# Patient Record
Sex: FEMALE | Race: WHITE | NOT HISPANIC OR LATINO | Employment: FULL TIME | URBAN - METROPOLITAN AREA
[De-identification: names, ages, dates, MRNs, and addresses within clinical notes are randomized per-mention and may not be internally consistent; named-entity substitution may affect disease eponyms.]

---

## 2024-05-29 ENCOUNTER — OFFICE VISIT (OUTPATIENT)
Age: 34
End: 2024-05-29
Payer: COMMERCIAL

## 2024-05-29 VITALS
WEIGHT: 217.5 LBS | DIASTOLIC BLOOD PRESSURE: 82 MMHG | OXYGEN SATURATION: 94 % | BODY MASS INDEX: 36.24 KG/M2 | HEIGHT: 65 IN | SYSTOLIC BLOOD PRESSURE: 120 MMHG | HEART RATE: 68 BPM

## 2024-05-29 DIAGNOSIS — M25.511 CHRONIC RIGHT SHOULDER PAIN: ICD-10-CM

## 2024-05-29 DIAGNOSIS — G89.29 CHRONIC RIGHT SHOULDER PAIN: ICD-10-CM

## 2024-05-29 DIAGNOSIS — Z23 NEED FOR VACCINATION: ICD-10-CM

## 2024-05-29 DIAGNOSIS — Z00.00 WELL ADULT EXAM: Primary | ICD-10-CM

## 2024-05-29 DIAGNOSIS — E66.09 CLASS 2 OBESITY DUE TO EXCESS CALORIES WITHOUT SERIOUS COMORBIDITY WITH BODY MASS INDEX (BMI) OF 36.0 TO 36.9 IN ADULT: ICD-10-CM

## 2024-05-29 PROCEDURE — 90471 IMMUNIZATION ADMIN: CPT | Performed by: FAMILY MEDICINE

## 2024-05-29 PROCEDURE — 99385 PREV VISIT NEW AGE 18-39: CPT | Performed by: FAMILY MEDICINE

## 2024-05-29 PROCEDURE — 90715 TDAP VACCINE 7 YRS/> IM: CPT | Performed by: FAMILY MEDICINE

## 2024-05-29 NOTE — LETTER
[facility]  Vaccine Consent Form    Patient Name:  Fela Landaverde  Patient :  1990     Vaccine(s) Ordered    Tdap Vaccine Greater Than or Equal To 6yo IM        Screening Checklist  The following questions should be completed prior to vaccination. If you answer “yes” to any question, it does not necessarily mean you should not be vaccinated. It just means we may need to clarify or ask more questions. If a question is unclear, please ask your healthcare provider to explain it.    Yes No   Any fever or moderate to severe illness today (mild illness and/or antibiotic treatment are not contraindications)?     Do you have a history of a serious reaction to any previous vaccinations, such as anaphylaxis, encephalopathy within 7 days, Guillain-Chatfield syndrome within 6 weeks, seizure?     Have you received any live vaccine(s) (e.g MMR, BUBBA) or any other vaccines in the last month (to ensure duplicate doses aren't given)?     Do you have an anaphylactic allergy to latex (DTaP, DTaP-IPV, Hep A, Hep B, MenB, RV, Td, Tdap), baker’s yeast (Hep B, HPV), polysorbates (RSV, nirsevimab, PCV 20, Rotavirrus, Tdap, Shingrix), or gelatin (BUBBA, MMR)?     Do you have an anaphylactic allergy to neomycin (Rabies, BUBBA, MMR, IPV, Hep A), polymyxin B (IPV), or streptomycin (IPV)?      Any cancer, leukemia, AIDS, or other immune system disorder? (BUBBA, MMR, RV)     Do you have a parent, brother, or sister with an immune system problem (if immune competence of vaccine recipient clinically verified, can proceed)? (MMR, BUBBA)     Any recent steroid treatments for >2 weeks, chemotherapy, or radiation treatment? (BUBBA, MMR)     Have you received antibody-containing blood transfusions or IVIG in the past 11 months (recommended interval is dependent on product)? (MMR, BUBBA)     Have you taken antiviral drugs (acyclovir, famciclovir, valacyclovir for BUBBA) in the last 24 or 48 hours, respectively?      Are you pregnant or planning to  "become pregnant within 1 month? (BUBBA, MMR, HPV, IPV, MenB, Abrexvy; For Hep B- refer to Engerix-B; For RSV - Abrysvo is indicated for 32-36 weeks of pregnancy from September to January)     For infants, have you ever been told your child has had intussusception or a medical emergency involving obstruction of the intestine (Rotavirus)? If not for an infant, can skip this question.         *Ordering Physicians/APC should be consulted if \"yes\" is checked by the patient or guardian above.  I have received, read, and understand the Vaccine Information Statement (VIS) for each vaccine ordered.  I have considered my or my child's health status as well as the health status of my close contacts.  I have taken the opportunity to discuss my vaccine questions with my or my child's health care provider.   I have requested that the ordered vaccine(s) be given to me or my child.  I understand the benefits and risks of the vaccines.  I understand that I should remain in the clinic for 15 minutes after receiving the vaccine(s).  _________________________________________________________  Signature of Patient or Parent/Legal Guardian ____________________  Date     "

## 2024-05-29 NOTE — PROGRESS NOTES
"Chief Complaint  New Patient and Annual Exam    Subjective          Fela Landaverde presents to [facility] for   History of Present Illness    Highest weight 225 lbs and working on weight loss. She lost weight to 180s lbs during COVID. She grieved the death of her horse two years ago and started comfort eating. Mental health is improving. She is changing jobs as she had burnout with last job. She has h/o anxiety and depression but never took prescriptions. Intermittent fasting 20/4. She rides horses once a week. She does paddle boarding in the Summer. She takes 15 minutes walks twice a day. Goes to the gym once a week.    She had a shoulder injury in childhood from a horse. She had xrays done at  which were normal. Exercises at gym increase pain.             5/29/2024     3:29 PM   PHQ-2/PHQ-9 Depression Screening   Little Interest or Pleasure in Doing Things 0-->not at all   Feeling Down, Depressed or Hopeless 0-->not at all   PHQ-9: Brief Depression Severity Measure Score 0       Objective   Vital Signs:   Vitals:    05/29/24 1525   BP: 120/82   Pulse: 68   SpO2: 94%   Weight: 98.7 kg (217 lb 8 oz)   Height: 165.1 cm (65\")   PainSc: 0-No pain     Body mass index is 36.19 kg/m².              Physical Exam  Constitutional:       General: She is not in acute distress.     Appearance: She is obese.   HENT:      Right Ear: Tympanic membrane and ear canal normal.      Left Ear: Tympanic membrane and ear canal normal.      Nose: No congestion or rhinorrhea.      Mouth/Throat:      Mouth: Mucous membranes are moist.      Pharynx: No oropharyngeal exudate or posterior oropharyngeal erythema.   Eyes:      General:         Right eye: No discharge.         Left eye: No discharge.      Conjunctiva/sclera: Conjunctivae normal.   Neck:      Thyroid: No thyromegaly.   Cardiovascular:      Rate and Rhythm: Normal rate and regular rhythm.   Pulmonary:      Effort: Pulmonary effort is normal. "      Breath sounds: Normal breath sounds.   Abdominal:      Palpations: Abdomen is soft. There is no hepatomegaly.      Tenderness: There is no abdominal tenderness.   Musculoskeletal:      Right shoulder: No swelling, deformity, tenderness or bony tenderness. Normal range of motion. Normal strength.      Cervical back: Neck supple.   Lymphadenopathy:      Head:      Right side of head: No submandibular, preauricular or posterior auricular adenopathy.      Left side of head: No submandibular, preauricular or posterior auricular adenopathy.      Cervical: No cervical adenopathy.   Skin:     General: Skin is warm.   Neurological:      Mental Status: She is alert and oriented to person, place, and time.   Psychiatric:         Mood and Affect: Mood normal.         Behavior: Behavior normal.         Thought Content: Thought content normal.         Judgment: Judgment normal.        Result Review :                Immunization History   Administered Date(s) Administered    COVID-19 (PFIZER) Purple Cap Monovalent 03/18/2021, 04/09/2021, 01/04/2022    Tdap 05/29/2024       Health Maintenance   Topic Date Due    BMI FOLLOWUP  Never done    COVID-19 Vaccine (4 - 2023-24 season) 09/01/2023    HEPATITIS C SCREENING  Never done    ANNUAL PHYSICAL  Never done    PAP SMEAR  Never done    INFLUENZA VACCINE  08/01/2024    TDAP/TD VACCINES (2 - Td or Tdap) 05/29/2034    Pneumococcal Vaccine 0-64  Aged Out            Assessment and Plan    Diagnoses and all orders for this visit:    1. Well adult exam (Primary)  -     CBC (No Diff); Future  -     Comprehensive Metabolic Panel; Future  -     Lipid Panel; Future  -     TSH Rfx On Abnormal To Free T4; Future  Return when fasting to check labs.  Recommend cervical cancer screening Pap smear but she deferred today.  Update immunizations with Tdap.  Consider HPV vaccine series if she was not vaccinated as an adolescent.  2. Class 2 obesity due to excess calories without serious comorbidity  with body mass index (BMI) of 36.0 to 36.9 in adult  Class 2 Severe Obesity (BMI >=35 and <=39.9). Obesity-related health conditions include the following: none. Obesity is newly identified. BMI is is above average; BMI management plan is completed. We discussed low calorie, low carb based diet program, increasing exercise, and intermittent fasting using electrolytes, Mediterranean style diet, high-protein low-carb .    3. Need for vaccination  -     Tdap Vaccine Greater Than or Equal To 8yo IM    4. Chronic right shoulder pain  Recommend further evaluation with orthopedics given her previous traumatic injury.  She is changing insurances and will call back with new insurance info at the time I will place the referral.      Counseling/anticipatory guidance: Nutrition, physical activity,  mental health, , immunizations, screenings      Follow Up   Return in about 1 year (around 5/30/2025) for Physical with Pap.  Patient was given instructions and counseling regarding her condition or for health maintenance advice. Please see specific information pulled into the AVS if appropriate.      Electronically signed by Eileen Alarcon MD, 05/29/24, 4:38 PM EDT.

## 2024-08-14 ENCOUNTER — LAB (OUTPATIENT)
Age: 34
End: 2024-08-14
Payer: COMMERCIAL

## 2024-08-14 DIAGNOSIS — Z00.00 ROUTINE GENERAL MEDICAL EXAMINATION AT A HEALTH CARE FACILITY: Primary | ICD-10-CM

## 2024-08-14 DIAGNOSIS — Z00.00 WELL ADULT EXAM: ICD-10-CM

## 2024-08-14 DIAGNOSIS — R73.01 ELEVATED FASTING GLUCOSE: ICD-10-CM

## 2024-08-14 LAB
DEPRECATED RDW RBC AUTO: 41.2 FL (ref 37–54)
ERYTHROCYTE [DISTWIDTH] IN BLOOD BY AUTOMATED COUNT: 13.8 % (ref 12.3–15.4)
HCT VFR BLD AUTO: 43.3 % (ref 34–46.6)
HGB BLD-MCNC: 14.1 G/DL (ref 12–15.9)
MCH RBC QN AUTO: 26.9 PG (ref 26.6–33)
MCHC RBC AUTO-ENTMCNC: 32.6 G/DL (ref 31.5–35.7)
MCV RBC AUTO: 82.6 FL (ref 79–97)
PLATELET # BLD AUTO: 249 10*3/MM3 (ref 140–450)
PMV BLD AUTO: 13.3 FL (ref 6–12)
RBC # BLD AUTO: 5.24 10*6/MM3 (ref 3.77–5.28)
WBC NRBC COR # BLD AUTO: 5.92 10*3/MM3 (ref 3.4–10.8)

## 2024-08-14 PROCEDURE — 36415 COLL VENOUS BLD VENIPUNCTURE: CPT | Performed by: FAMILY MEDICINE

## 2024-08-14 PROCEDURE — 80050 GENERAL HEALTH PANEL: CPT | Performed by: FAMILY MEDICINE

## 2024-08-14 PROCEDURE — 80061 LIPID PANEL: CPT | Performed by: FAMILY MEDICINE

## 2024-08-14 PROCEDURE — 83036 HEMOGLOBIN GLYCOSYLATED A1C: CPT | Performed by: FAMILY MEDICINE

## 2024-08-15 DIAGNOSIS — R73.01 ELEVATED FASTING GLUCOSE: Primary | ICD-10-CM

## 2024-08-15 LAB
ALBUMIN SERPL-MCNC: 4.5 G/DL (ref 3.5–5.2)
ALBUMIN/GLOB SERPL: 1.6 G/DL
ALP SERPL-CCNC: 87 U/L (ref 39–117)
ALT SERPL W P-5'-P-CCNC: 16 U/L (ref 1–33)
ANION GAP SERPL CALCULATED.3IONS-SCNC: 11 MMOL/L (ref 5–15)
AST SERPL-CCNC: 16 U/L (ref 1–32)
BILIRUB SERPL-MCNC: 0.3 MG/DL (ref 0–1.2)
BUN SERPL-MCNC: 6 MG/DL (ref 6–20)
BUN/CREAT SERPL: 7.4 (ref 7–25)
CALCIUM SPEC-SCNC: 9.5 MG/DL (ref 8.6–10.5)
CHLORIDE SERPL-SCNC: 107 MMOL/L (ref 98–107)
CHOLEST SERPL-MCNC: 206 MG/DL (ref 0–200)
CO2 SERPL-SCNC: 22 MMOL/L (ref 22–29)
CREAT SERPL-MCNC: 0.81 MG/DL (ref 0.57–1)
EGFRCR SERPLBLD CKD-EPI 2021: 99.1 ML/MIN/1.73
GLOBULIN UR ELPH-MCNC: 2.9 GM/DL
GLUCOSE SERPL-MCNC: 108 MG/DL (ref 65–99)
HBA1C MFR BLD: 5.5 % (ref 4.8–5.6)
HDLC SERPL-MCNC: 56 MG/DL (ref 40–60)
LDLC SERPL CALC-MCNC: 139 MG/DL (ref 0–100)
LDLC/HDLC SERPL: 2.45 {RATIO}
POTASSIUM SERPL-SCNC: 4.2 MMOL/L (ref 3.5–5.2)
PROT SERPL-MCNC: 7.4 G/DL (ref 6–8.5)
SODIUM SERPL-SCNC: 140 MMOL/L (ref 136–145)
TRIGL SERPL-MCNC: 63 MG/DL (ref 0–150)
TSH SERPL DL<=0.05 MIU/L-ACNC: 0.91 UIU/ML (ref 0.27–4.2)
VLDLC SERPL-MCNC: 11 MG/DL (ref 5–40)

## 2025-06-02 ENCOUNTER — LAB (OUTPATIENT)
Age: 35
End: 2025-06-02
Payer: COMMERCIAL

## 2025-06-02 ENCOUNTER — OFFICE VISIT (OUTPATIENT)
Age: 35
End: 2025-06-02
Payer: COMMERCIAL

## 2025-06-02 VITALS
WEIGHT: 239.38 LBS | SYSTOLIC BLOOD PRESSURE: 122 MMHG | HEART RATE: 86 BPM | HEIGHT: 66 IN | OXYGEN SATURATION: 98 % | DIASTOLIC BLOOD PRESSURE: 74 MMHG | BODY MASS INDEX: 38.47 KG/M2

## 2025-06-02 DIAGNOSIS — E78.00 PURE HYPERCHOLESTEROLEMIA: ICD-10-CM

## 2025-06-02 DIAGNOSIS — R73.01 ELEVATED FASTING GLUCOSE: ICD-10-CM

## 2025-06-02 DIAGNOSIS — E66.812 CLASS 2 OBESITY DUE TO EXCESS CALORIES WITHOUT SERIOUS COMORBIDITY WITH BODY MASS INDEX (BMI) OF 38.0 TO 38.9 IN ADULT: ICD-10-CM

## 2025-06-02 DIAGNOSIS — Z00.00 WELL ADULT EXAM: ICD-10-CM

## 2025-06-02 DIAGNOSIS — Z30.45 INITIAL ENCOUNTER FOR MANAGEMENT OF CONTRACEPTIVE PATCH USE: ICD-10-CM

## 2025-06-02 DIAGNOSIS — E66.09 CLASS 2 OBESITY DUE TO EXCESS CALORIES WITHOUT SERIOUS COMORBIDITY WITH BODY MASS INDEX (BMI) OF 38.0 TO 38.9 IN ADULT: ICD-10-CM

## 2025-06-02 DIAGNOSIS — Z00.00 WELL ADULT EXAM: Primary | ICD-10-CM

## 2025-06-02 DIAGNOSIS — Z12.4 SCREENING FOR CERVICAL CANCER: ICD-10-CM

## 2025-06-02 LAB — HBA1C MFR BLD: 5.4 % (ref 4.8–5.6)

## 2025-06-02 PROCEDURE — 83036 HEMOGLOBIN GLYCOSYLATED A1C: CPT | Performed by: FAMILY MEDICINE

## 2025-06-02 PROCEDURE — 80050 GENERAL HEALTH PANEL: CPT | Performed by: FAMILY MEDICINE

## 2025-06-02 PROCEDURE — 99214 OFFICE O/P EST MOD 30 MIN: CPT | Performed by: FAMILY MEDICINE

## 2025-06-02 PROCEDURE — 80061 LIPID PANEL: CPT | Performed by: FAMILY MEDICINE

## 2025-06-02 PROCEDURE — 99395 PREV VISIT EST AGE 18-39: CPT | Performed by: FAMILY MEDICINE

## 2025-06-02 PROCEDURE — 36415 COLL VENOUS BLD VENIPUNCTURE: CPT | Performed by: FAMILY MEDICINE

## 2025-06-02 RX ORDER — NORELGESTROMIN AND ETHINYL ESTRADIOL 35; 150 UG/MG; UG/MG
1 PATCH TRANSDERMAL WEEKLY
Qty: 4 PATCH | Refills: 11 | Status: SHIPPED | OUTPATIENT
Start: 2025-06-02 | End: 2026-06-02

## 2025-06-02 NOTE — PROGRESS NOTES
Chief Complaint  Annual Exam and Gynecologic Exam    Subjective              Fela Landaverde presents to [facility] for     annual check up  Pertinent negative symptoms include no abdominal pain, no anorexia, no joint pain, no change in stool, no chest pain, no chills, no congestion, no cough, no diaphoresis, no fatigue, no fever, no headaches, no joint swelling, no myalgias, no nausea, no neck pain, no numbness, no rash, no sore throat, no swollen glands, no dysuria, no vertigo and no weakness.     History of Present Illness  The patient is a 34-year-old female presenting for an annual physical exam.    She reports no significant changes in her health status over the past year. She has been on tirzepatide for 3 weeks, which has resulted in a weight loss of approximately 15 pounds. The first week showed a significant drop in weight, followed by a gradual decrease. This medication has also led to a decrease in her appetite. Her diet is generally healthy, with a focus on protein intake, although she acknowledges the need for further dietary restrictions. She consumes protein yogurt and fruits and is working on increasing her vegetable intake. She describes herself as a selective eater. Since starting tirzepatide, her snacking habits have improved, and she no longer feels the need to constantly munch on unhealthy snacks. She has previously discussed binge eating with her therapist. She has not experienced any side effects from tirzepatide. Her goal is to achieve a weight of 130 pounds. She engages in regular physical activity, including walking and riding, and plans to start paddle boarding in the summer. She has prescription sunglasses for driving but reports no changes in her vision. She takes a multivitamin supplement.    She is considering birth control options to manage her menstrual cycle, which she finds inconvenient due to its regularity. Her menstrual cycle is typically  "28 days long, with a bleeding duration of 4 days. The flow is usually normal, although she has experienced heavy bleeding on some occasions. She expresses a desire to completely stop her periods.    GYNECOLOGICAL HISTORY:  - Cycle Length: 28 days  - Duration: 4 days  - Frequency and Flow: Normal, occasionally heavy    SOCIAL HISTORY  She has never been sexually active. She does not smoke, drink alcohol, or use drugs.    FAMILY HISTORY  There is no family history of blood clotting disorders that she is aware of.    Objective   Vital Signs:   Vitals:    06/02/25 1353   BP: 122/74   BP Location: Left arm   Patient Position: Sitting   Cuff Size: Adult   Pulse: 86   SpO2: 98%   Weight: 109 kg (239 lb 6 oz)   Height: 167 cm (65.75\")     Body mass index is 38.93 kg/m².    Class 2 Severe Obesity (BMI >=35 and <=39.9). Obesity-related health conditions include the following: impaired fasting glucose and dyslipidemias. Obesity is improving with treatment. BMI is is above average; BMI management plan is completed. We discussed increasing exercise, pharmacologic options including GLP-1, and high-protein diet.        The following portions of the patient's history were reviewed and updated as appropriate: allergies, current medications, past family history, past medical history, past social history, past surgical history, and problem list.      Physical Exam  Vitals reviewed. Exam conducted with a chaperone present.   Constitutional:       General: She is not in acute distress.     Appearance: She is obese. She is not ill-appearing.   HENT:      Right Ear: Tympanic membrane and ear canal normal.      Left Ear: Tympanic membrane and ear canal normal.      Nose: No congestion or rhinorrhea.      Mouth/Throat:      Pharynx: No oropharyngeal exudate or posterior oropharyngeal erythema.   Eyes:      General:         Right eye: No discharge.         Left eye: No discharge.      Conjunctiva/sclera: Conjunctivae normal.   Neck:      " Thyroid: No thyromegaly.   Cardiovascular:      Rate and Rhythm: Normal rate and regular rhythm.   Pulmonary:      Effort: Pulmonary effort is normal. No respiratory distress.      Breath sounds: Normal breath sounds.   Chest:   Breasts:     Right: Normal. No mass, nipple discharge, skin change or tenderness.      Left: Normal. No mass, nipple discharge, skin change or tenderness.   Abdominal:      Palpations: Abdomen is soft.      Tenderness: There is no abdominal tenderness.   Genitourinary:     General: Normal vulva.      Exam position: Lithotomy position.      Pubic Area: No rash.       Labia:         Right: No lesion.         Left: No lesion.       Urethra: No urethral lesion.      Vagina: Vaginal discharge (thin, yellow) present.      Cervix: Normal.      Uterus: Normal.       Adnexa:         Right: No mass or tenderness.          Left: No mass or tenderness.        Comments: Normal external genitalia    Musculoskeletal:      Cervical back: Neck supple.      Right lower leg: No edema.      Left lower leg: No edema.   Lymphadenopathy:      Cervical: No cervical adenopathy.      Right cervical: No superficial cervical adenopathy.     Left cervical: No superficial cervical adenopathy.      Upper Body:      Right upper body: No supraclavicular or axillary adenopathy.      Left upper body: No supraclavicular or axillary adenopathy.   Skin:     General: Skin is warm.      Findings: No rash.   Neurological:      Mental Status: She is alert and oriented to person, place, and time.      Gait: Gait normal.   Psychiatric:         Mood and Affect: Mood normal.         Behavior: Behavior normal.         Thought Content: Thought content normal.         Judgment: Judgment normal.        Result Review :                Immunization History   Administered Date(s) Administered    COVID-19 (PFIZER) Purple Cap Monovalent 03/18/2021, 04/09/2021, 01/04/2022    Tdap 05/29/2024       Health Maintenance   Topic Date Due    PAP SMEAR   Never done    HEPATITIS C SCREENING  Never done    COVID-19 Vaccine (4 - 2024-25 season) 09/01/2024    ANNUAL PHYSICAL  05/29/2025    INFLUENZA VACCINE  07/01/2025    LIPID PANEL  08/14/2025    TDAP/TD VACCINES (2 - Td or Tdap) 05/29/2034    Pneumococcal Vaccine 0-49  Aged Out            Assessment and Plan    Diagnoses and all orders for this visit:    1. Well adult exam (Primary)  -     CBC (No Diff); Future  -     Comprehensive Metabolic Panel; Future  -     Lipid Panel; Future  -     Hemoglobin A1c; Future  -     TSH Rfx On Abnormal To Free T4; Future    2. Screening for cervical cancer  -     LIQUID-BASED PAP SMEAR WITH HPV GENOTYPING IF ASCUS ([facility]); Future  -     LIQUID-BASED PAP SMEAR WITH HPV GENOTYPING IF ASCUS ([facility])    3. Class 2 obesity due to excess calories without serious comorbidity with body mass index (BMI) of 38.0 to 38.9 in adult    4. Elevated fasting glucose    5. Pure hypercholesterolemia    6. Initial encounter for management of contraceptive patch use  -     norelgestromin-ethinyl estradiol (ORTHO EVRA) 150-35 MCG/24HR; Place 1 patch on the skin as directed by provider 1 (One) Time Per Week. Continuous use skipping placebo week  Dispense: 4 patch; Refill: 11        Assessment & Plan  1. Annual physical examination.  - Cholesterol levels were mildly elevated during the last assessment in 08/2024, and blood glucose levels were borderline. Thyroid function tests were within normal limits, and there were no indications of anemia.  - Responding positively to tirzepatide, which is encouraging as it often requires a higher dosage to observe benefits. Adequate protein intake during weight fluctuations is crucial to maintain sufficient caloric and nutrient intake. She has identified the need to increase her consumption of fruits and vegetables.  - Current weight goal is ambitious, aiming for a significant reduction, which may take 1 to 2 years to achieve. It is important to prepare for  potential plateaus in weight loss progress, which can be frustrating but are a normal part of the process.  - Pap smear conducted today, and results will be available by the end of the week. Fasting blood work ordered with results expected by tomorrow.  - Immunizations are up-to-date.    2. Menstrual cycle management.  - Considering hormonal contraceptives to regulate her menstrual cycle, with the goal of completely stopping her periods.  - Potential interaction between tirzepatide and hormonal contraceptives discussed, as tirzepatide may delay stomach emptying and alter drug absorption. Risks associated with hormonal contraceptives, including potential increases in blood pressure and risk of blood clots, were also discussed. Her blood pressure is currently within normal range.  - Various contraceptive options explored, including a weekly patch that would be applied continuously without a placebo week. Importance of ensuring the patch adheres properly to the skin emphasized, as well as the need to rotate application sites.  - Advised to use condoms for pregnancy prevention if she becomes sexually active with a male partner, as tirzepatide could potentially decrease the effectiveness of hormonal contraceptives for pregnancy prevention. Potential side effects of hormonal contraceptives discussed. Prescription for a continuous use contraceptive patch will be sent to pharmacy. If she experiences any issues with the birth control patch, she should schedule a follow-up visit to discuss alternative options.    Follow-up  The patient will follow up in 1 year for her next physical examination.    PROCEDURE  Procedure: Pap smear, pelvic exam    All questions were answered and agreement to proceed was given after the following Pre-Procedure details were reviewed:  - Risks and Benefits: Discussed discomfort and pressure during the procedure, potential detection of cervical cancer.  - Alternative Options: Discussed deferring  pelvic exams unless sexually active.  - Consent: Verbal consent obtained.    Intra-Procedure:  - Site Preparation: Use of lubricating jelly on speculum.    Post-Procedure:  - Tolerance Level: Procedure tolerated well.  - Home Care Instructions: Results of Pap smear will be available by the end of the week. Blood work results will be available by tomorrow. If any issues arise with the birth control patch, schedule a follow-up visit.    Counseling/anticipatory guidance: Nutrition, physical activity, healthy weight, dental health, eye exam, immunizations, screenings      Follow Up   Return in about 1 year (around 6/3/2026) for Physical.  Patient was given instructions and counseling regarding her condition or for health maintenance advice. Please see specific information pulled into the AVS if appropriate.     Patient or patient representative verbalized consent for the use of Ambient Listening during the visit with  Eileen Alarcon MD for chart documentation. 6/2/2025  14:34 EDT    Electronically signed by Eileen Alarcon MD, 06/02/25, 2:34 PM EDT.

## 2025-06-03 LAB
ALBUMIN SERPL-MCNC: 4.6 G/DL (ref 3.5–5.2)
ALBUMIN/GLOB SERPL: 1.4 G/DL
ALP SERPL-CCNC: 98 U/L (ref 39–117)
ALT SERPL W P-5'-P-CCNC: 17 U/L (ref 1–33)
ANION GAP SERPL CALCULATED.3IONS-SCNC: 12.8 MMOL/L (ref 5–15)
AST SERPL-CCNC: 21 U/L (ref 1–32)
BILIRUB SERPL-MCNC: 0.6 MG/DL (ref 0–1.2)
BUN SERPL-MCNC: 8 MG/DL (ref 6–20)
BUN/CREAT SERPL: 8.7 (ref 7–25)
CALCIUM SPEC-SCNC: 9.7 MG/DL (ref 8.6–10.5)
CHLORIDE SERPL-SCNC: 100 MMOL/L (ref 98–107)
CHOLEST SERPL-MCNC: 167 MG/DL (ref 0–200)
CO2 SERPL-SCNC: 23.2 MMOL/L (ref 22–29)
CREAT SERPL-MCNC: 0.92 MG/DL (ref 0.57–1)
DEPRECATED RDW RBC AUTO: 43.5 FL (ref 37–54)
EGFRCR SERPLBLD CKD-EPI 2021: 84.5 ML/MIN/1.73
ERYTHROCYTE [DISTWIDTH] IN BLOOD BY AUTOMATED COUNT: 14.6 % (ref 12.3–15.4)
GLOBULIN UR ELPH-MCNC: 3.4 GM/DL
GLUCOSE SERPL-MCNC: 83 MG/DL (ref 65–99)
HCT VFR BLD AUTO: 46.2 % (ref 34–46.6)
HDLC SERPL-MCNC: 54 MG/DL (ref 40–60)
HGB BLD-MCNC: 14.9 G/DL (ref 12–15.9)
LDLC SERPL CALC-MCNC: 102 MG/DL (ref 0–100)
LDLC/HDLC SERPL: 1.9 {RATIO}
MCH RBC QN AUTO: 27 PG (ref 26.6–33)
MCHC RBC AUTO-ENTMCNC: 32.3 G/DL (ref 31.5–35.7)
MCV RBC AUTO: 83.7 FL (ref 79–97)
PLATELET # BLD AUTO: 283 10*3/MM3 (ref 140–450)
PMV BLD AUTO: 12.4 FL (ref 6–12)
POTASSIUM SERPL-SCNC: 3.9 MMOL/L (ref 3.5–5.2)
PROT SERPL-MCNC: 8 G/DL (ref 6–8.5)
RBC # BLD AUTO: 5.52 10*6/MM3 (ref 3.77–5.28)
SODIUM SERPL-SCNC: 136 MMOL/L (ref 136–145)
TRIGL SERPL-MCNC: 53 MG/DL (ref 0–150)
TSH SERPL DL<=0.05 MIU/L-ACNC: 0.85 UIU/ML (ref 0.27–4.2)
VLDLC SERPL-MCNC: 11 MG/DL (ref 5–40)
WBC NRBC COR # BLD AUTO: 7.7 10*3/MM3 (ref 3.4–10.8)

## 2025-06-04 LAB — REF LAB TEST METHOD: NORMAL

## 2025-06-23 DIAGNOSIS — Z30.45 INITIAL ENCOUNTER FOR MANAGEMENT OF CONTRACEPTIVE PATCH USE: ICD-10-CM

## 2025-06-24 RX ORDER — NORELGESTROMIN AND ETHINYL ESTRADIOL 35; 150 UG/MG; UG/MG
1 PATCH TRANSDERMAL WEEKLY
Qty: 4 PATCH | Refills: 11 | Status: SHIPPED | OUTPATIENT
Start: 2025-06-24 | End: 2026-06-24